# Patient Record
Sex: MALE | Race: WHITE | NOT HISPANIC OR LATINO | URBAN - METROPOLITAN AREA
[De-identification: names, ages, dates, MRNs, and addresses within clinical notes are randomized per-mention and may not be internally consistent; named-entity substitution may affect disease eponyms.]

---

## 2017-07-24 ENCOUNTER — OFFICE VISIT - HEALTHEAST (OUTPATIENT)
Dept: INTERNAL MEDICINE | Facility: CLINIC | Age: 52
End: 2017-07-24

## 2017-07-24 DIAGNOSIS — K21.9 GERD (GASTROESOPHAGEAL REFLUX DISEASE): ICD-10-CM

## 2017-07-24 DIAGNOSIS — Z00.00 ROUTINE GENERAL MEDICAL EXAMINATION AT A HEALTH CARE FACILITY: ICD-10-CM

## 2017-07-24 LAB
CHOLEST SERPL-MCNC: 216 MG/DL
FASTING STATUS PATIENT QL REPORTED: YES
HDLC SERPL-MCNC: 58 MG/DL
LDLC SERPL CALC-MCNC: 144 MG/DL
PSA SERPL-MCNC: 0.8 NG/ML (ref 0–3.5)
TRIGL SERPL-MCNC: 72 MG/DL

## 2017-07-24 ASSESSMENT — MIFFLIN-ST. JEOR: SCORE: 1911.27

## 2017-07-26 ENCOUNTER — COMMUNICATION - HEALTHEAST (OUTPATIENT)
Dept: INTERNAL MEDICINE | Facility: CLINIC | Age: 52
End: 2017-07-26

## 2017-10-09 ENCOUNTER — OFFICE VISIT - HEALTHEAST (OUTPATIENT)
Dept: INTERNAL MEDICINE | Facility: CLINIC | Age: 52
End: 2017-10-09

## 2017-10-09 ENCOUNTER — RECORDS - HEALTHEAST (OUTPATIENT)
Dept: GENERAL RADIOLOGY | Facility: CLINIC | Age: 52
End: 2017-10-09

## 2017-10-09 DIAGNOSIS — M25.552 LEFT HIP PAIN: ICD-10-CM

## 2017-10-09 DIAGNOSIS — Z23 NEED FOR IMMUNIZATION AGAINST INFLUENZA: ICD-10-CM

## 2017-10-09 DIAGNOSIS — M25.552 PAIN IN LEFT HIP: ICD-10-CM

## 2017-10-09 DIAGNOSIS — M54.16 LUMBAR RADICULOPATHY, CHRONIC: ICD-10-CM

## 2017-10-09 ASSESSMENT — MIFFLIN-ST. JEOR: SCORE: 1929.42

## 2017-10-10 ENCOUNTER — HOSPITAL ENCOUNTER (OUTPATIENT)
Dept: MRI IMAGING | Facility: CLINIC | Age: 52
Discharge: HOME | End: 2017-10-10
Attending: INTERNAL MEDICINE

## 2017-10-10 ENCOUNTER — COMMUNICATION - HEALTHEAST (OUTPATIENT)
Dept: INTERNAL MEDICINE | Facility: CLINIC | Age: 52
End: 2017-10-10

## 2017-10-10 DIAGNOSIS — M54.16 LUMBAR RADICULOPATHY, CHRONIC: ICD-10-CM

## 2017-10-13 ENCOUNTER — RECORDS - HEALTHEAST (OUTPATIENT)
Dept: ADMINISTRATIVE | Facility: OTHER | Age: 52
End: 2017-10-13

## 2017-11-21 ENCOUNTER — COMMUNICATION - HEALTHEAST (OUTPATIENT)
Dept: INTERNAL MEDICINE | Facility: CLINIC | Age: 52
End: 2017-11-21

## 2017-12-14 ENCOUNTER — COMMUNICATION - HEALTHEAST (OUTPATIENT)
Dept: INTERNAL MEDICINE | Facility: CLINIC | Age: 52
End: 2017-12-14

## 2017-12-18 ENCOUNTER — OFFICE VISIT - HEALTHEAST (OUTPATIENT)
Dept: INTERNAL MEDICINE | Facility: CLINIC | Age: 52
End: 2017-12-18

## 2017-12-18 DIAGNOSIS — M54.16 LEFT LUMBAR RADICULOPATHY: ICD-10-CM

## 2017-12-18 DIAGNOSIS — Z01.818 PREOP EXAMINATION: ICD-10-CM

## 2017-12-18 DIAGNOSIS — Z12.11 SCREEN FOR COLON CANCER: ICD-10-CM

## 2017-12-18 DIAGNOSIS — K21.00 GASTROESOPHAGEAL REFLUX DISEASE WITH ESOPHAGITIS: ICD-10-CM

## 2017-12-18 LAB
ATRIAL RATE - MUSE: 47 BPM
DIASTOLIC BLOOD PRESSURE - MUSE: NORMAL MMHG
INTERPRETATION ECG - MUSE: NORMAL
P AXIS - MUSE: 36 DEGREES
PR INTERVAL - MUSE: 180 MS
QRS DURATION - MUSE: 114 MS
QT - MUSE: 426 MS
QTC - MUSE: 377 MS
R AXIS - MUSE: -18 DEGREES
SYSTOLIC BLOOD PRESSURE - MUSE: NORMAL MMHG
T AXIS - MUSE: -11 DEGREES
VENTRICULAR RATE- MUSE: 47 BPM

## 2017-12-18 ASSESSMENT — MIFFLIN-ST. JEOR: SCORE: 1911.27

## 2018-05-31 ENCOUNTER — OFFICE VISIT - HEALTHEAST (OUTPATIENT)
Dept: INTERNAL MEDICINE | Facility: CLINIC | Age: 53
End: 2018-05-31

## 2018-05-31 DIAGNOSIS — J20.9 ACUTE BRONCHITIS, UNSPECIFIED ORGANISM: ICD-10-CM

## 2018-05-31 ASSESSMENT — MIFFLIN-ST. JEOR: SCORE: 1938.49

## 2018-06-21 ENCOUNTER — OFFICE VISIT - HEALTHEAST (OUTPATIENT)
Dept: INTERNAL MEDICINE | Facility: CLINIC | Age: 53
End: 2018-06-21

## 2018-06-21 DIAGNOSIS — J30.2 SEASONAL ALLERGIC RHINITIS: ICD-10-CM

## 2018-06-21 DIAGNOSIS — H65.90 SEROUS OTITIS MEDIA: ICD-10-CM

## 2018-06-21 ASSESSMENT — MIFFLIN-ST. JEOR: SCORE: 1952.1

## 2018-09-20 ENCOUNTER — COMMUNICATION - HEALTHEAST (OUTPATIENT)
Dept: INTERNAL MEDICINE | Facility: CLINIC | Age: 53
End: 2018-09-20

## 2019-03-05 ENCOUNTER — AMBULATORY - HEALTHEAST (OUTPATIENT)
Dept: INTERNAL MEDICINE | Facility: CLINIC | Age: 54
End: 2019-03-05

## 2019-03-05 DIAGNOSIS — Z85.038 ENCOUNTER FOR FOLLOW-UP SURVEILLANCE OF COLON CANCER: ICD-10-CM

## 2019-03-05 DIAGNOSIS — Z08 ENCOUNTER FOR FOLLOW-UP SURVEILLANCE OF COLON CANCER: ICD-10-CM

## 2019-07-08 ENCOUNTER — OFFICE VISIT - HEALTHEAST (OUTPATIENT)
Dept: INTERNAL MEDICINE | Facility: CLINIC | Age: 54
End: 2019-07-08

## 2019-07-08 DIAGNOSIS — B00.9 HERPES SIMPLEX VIRUS INFECTION: ICD-10-CM

## 2019-07-08 DIAGNOSIS — J01.90 ACUTE NON-RECURRENT SINUSITIS, UNSPECIFIED LOCATION: ICD-10-CM

## 2019-07-08 ASSESSMENT — MIFFLIN-ST. JEOR: SCORE: 1915.81

## 2019-08-15 ENCOUNTER — RECORDS - HEALTHEAST (OUTPATIENT)
Dept: ADMINISTRATIVE | Facility: OTHER | Age: 54
End: 2019-08-15

## 2019-08-19 ENCOUNTER — OFFICE VISIT - HEALTHEAST (OUTPATIENT)
Dept: SURGERY | Facility: CLINIC | Age: 54
End: 2019-08-19

## 2019-08-19 DIAGNOSIS — K61.0 PERIANAL ABSCESS: ICD-10-CM

## 2019-08-19 ASSESSMENT — MIFFLIN-ST. JEOR: SCORE: 1925.79

## 2019-09-16 ENCOUNTER — OFFICE VISIT - HEALTHEAST (OUTPATIENT)
Dept: SURGERY | Facility: CLINIC | Age: 54
End: 2019-09-16

## 2019-09-16 DIAGNOSIS — K64.9 HEMORRHOIDS, UNSPECIFIED HEMORRHOID TYPE: ICD-10-CM

## 2019-10-02 ENCOUNTER — COMMUNICATION - HEALTHEAST (OUTPATIENT)
Dept: INTERNAL MEDICINE | Facility: CLINIC | Age: 54
End: 2019-10-02

## 2019-10-02 DIAGNOSIS — K21.00 GASTROESOPHAGEAL REFLUX DISEASE WITH ESOPHAGITIS: ICD-10-CM

## 2019-11-20 ENCOUNTER — RECORDS - HEALTHEAST (OUTPATIENT)
Dept: ADMINISTRATIVE | Facility: OTHER | Age: 54
End: 2019-11-20

## 2020-01-21 ENCOUNTER — COMMUNICATION - HEALTHEAST (OUTPATIENT)
Dept: INTERNAL MEDICINE | Facility: CLINIC | Age: 55
End: 2020-01-21

## 2020-01-21 ENCOUNTER — AMBULATORY - HEALTHEAST (OUTPATIENT)
Dept: INTERNAL MEDICINE | Facility: CLINIC | Age: 55
End: 2020-01-21

## 2020-01-21 DIAGNOSIS — R20.0 NUMBNESS IN BOTH LEGS: ICD-10-CM

## 2020-01-29 ENCOUNTER — RECORDS - HEALTHEAST (OUTPATIENT)
Dept: ADMINISTRATIVE | Facility: OTHER | Age: 55
End: 2020-01-29

## 2020-04-01 ENCOUNTER — OFFICE VISIT - HEALTHEAST (OUTPATIENT)
Dept: INTERNAL MEDICINE | Facility: CLINIC | Age: 55
End: 2020-04-01

## 2020-04-01 DIAGNOSIS — J01.90 SUBACUTE SINUSITIS, UNSPECIFIED LOCATION: ICD-10-CM

## 2020-06-01 ENCOUNTER — COMMUNICATION - HEALTHEAST (OUTPATIENT)
Dept: INTERNAL MEDICINE | Facility: CLINIC | Age: 55
End: 2020-06-01

## 2020-06-05 ENCOUNTER — OFFICE VISIT - HEALTHEAST (OUTPATIENT)
Dept: INTERNAL MEDICINE | Facility: CLINIC | Age: 55
End: 2020-06-05

## 2020-06-05 DIAGNOSIS — Z01.818 PREOPERATIVE EXAMINATION: ICD-10-CM

## 2020-06-05 DIAGNOSIS — Z00.00 ROUTINE HISTORY AND PHYSICAL EXAMINATION OF ADULT: ICD-10-CM

## 2020-06-05 DIAGNOSIS — J32.9 CHRONIC SINUSITIS, UNSPECIFIED LOCATION: ICD-10-CM

## 2020-06-05 LAB
ANION GAP SERPL CALCULATED.3IONS-SCNC: 8 MMOL/L (ref 5–18)
ATRIAL RATE - MUSE: 48 BPM
BASOPHILS # BLD AUTO: 0 THOU/UL (ref 0–0.2)
BASOPHILS NFR BLD AUTO: 0 % (ref 0–2)
BUN SERPL-MCNC: 16 MG/DL (ref 8–22)
CALCIUM SERPL-MCNC: 9.8 MG/DL (ref 8.5–10.5)
CHLORIDE BLD-SCNC: 103 MMOL/L (ref 98–107)
CO2 SERPL-SCNC: 30 MMOL/L (ref 22–31)
CREAT SERPL-MCNC: 0.97 MG/DL (ref 0.7–1.3)
DIASTOLIC BLOOD PRESSURE - MUSE: NORMAL
EOSINOPHIL # BLD AUTO: 0.3 THOU/UL (ref 0–0.4)
EOSINOPHIL NFR BLD AUTO: 5 % (ref 0–6)
ERYTHROCYTE [DISTWIDTH] IN BLOOD BY AUTOMATED COUNT: 12.5 % (ref 11–14.5)
GFR SERPL CREATININE-BSD FRML MDRD: >60 ML/MIN/1.73M2
GLUCOSE BLD-MCNC: 105 MG/DL (ref 70–125)
HCT VFR BLD AUTO: 45 % (ref 40–54)
HGB BLD-MCNC: 15.4 G/DL (ref 14–18)
INTERPRETATION ECG - MUSE: NORMAL
LYMPHOCYTES # BLD AUTO: 2.1 THOU/UL (ref 0.8–4.4)
LYMPHOCYTES NFR BLD AUTO: 41 % (ref 20–40)
MCH RBC QN AUTO: 31 PG (ref 27–34)
MCHC RBC AUTO-ENTMCNC: 34.1 G/DL (ref 32–36)
MCV RBC AUTO: 91 FL (ref 80–100)
MONOCYTES # BLD AUTO: 0.3 THOU/UL (ref 0–0.9)
MONOCYTES NFR BLD AUTO: 6 % (ref 2–10)
NEUTROPHILS # BLD AUTO: 2.4 THOU/UL (ref 2–7.7)
NEUTROPHILS NFR BLD AUTO: 48 % (ref 50–70)
P AXIS - MUSE: 35 DEGREES
PLATELET # BLD AUTO: 265 THOU/UL (ref 140–440)
PMV BLD AUTO: 7.4 FL (ref 7–10)
POTASSIUM BLD-SCNC: 4.8 MMOL/L (ref 3.5–5)
PR INTERVAL - MUSE: 178 MS
QRS DURATION - MUSE: 96 MS
QT - MUSE: 424 MS
QTC - MUSE: 378 MS
R AXIS - MUSE: -20 DEGREES
RBC # BLD AUTO: 4.95 MILL/UL (ref 4.4–6.2)
SODIUM SERPL-SCNC: 141 MMOL/L (ref 136–145)
SYSTOLIC BLOOD PRESSURE - MUSE: NORMAL
T AXIS - MUSE: 1 DEGREES
VENTRICULAR RATE- MUSE: 48 BPM
WBC: 5.1 THOU/UL (ref 4–11)

## 2020-06-05 RX ORDER — OXYMETAZOLINE HYDROCHLORIDE 0.05 G/100ML
2 SPRAY NASAL 2 TIMES DAILY
Status: SHIPPED | COMMUNITY
Start: 2020-06-05

## 2020-06-05 ASSESSMENT — MIFFLIN-ST. JEOR: SCORE: 1906.74

## 2020-06-08 ENCOUNTER — COMMUNICATION - HEALTHEAST (OUTPATIENT)
Dept: INTERNAL MEDICINE | Facility: CLINIC | Age: 55
End: 2020-06-08

## 2020-06-19 ENCOUNTER — RECORDS - HEALTHEAST (OUTPATIENT)
Dept: ADMINISTRATIVE | Facility: OTHER | Age: 55
End: 2020-06-19

## 2020-07-04 ENCOUNTER — COMMUNICATION - HEALTHEAST (OUTPATIENT)
Dept: INTERNAL MEDICINE | Facility: CLINIC | Age: 55
End: 2020-07-04

## 2020-07-04 DIAGNOSIS — K21.00 GASTROESOPHAGEAL REFLUX DISEASE WITH ESOPHAGITIS: ICD-10-CM

## 2020-09-10 ENCOUNTER — COMMUNICATION - HEALTHEAST (OUTPATIENT)
Dept: INTERNAL MEDICINE | Facility: CLINIC | Age: 55
End: 2020-09-10

## 2020-10-08 ENCOUNTER — COMMUNICATION - HEALTHEAST (OUTPATIENT)
Dept: INTERNAL MEDICINE | Facility: CLINIC | Age: 55
End: 2020-10-08

## 2020-10-08 DIAGNOSIS — K21.00 GASTROESOPHAGEAL REFLUX DISEASE WITH ESOPHAGITIS: ICD-10-CM

## 2020-10-09 RX ORDER — PANTOPRAZOLE SODIUM 20 MG/1
TABLET, DELAYED RELEASE ORAL
Qty: 90 TABLET | Refills: 2 | Status: SHIPPED | OUTPATIENT
Start: 2020-10-09

## 2021-05-30 NOTE — PROGRESS NOTES
[facility] Follow Up Note    Marcello Mariee   54 y.o. male    Date of Visit: 7/8/2019    Chief Complaint   Patient presents with     Sinus Problem     started on Frrday     Cough     Mouth Lesions     wondering about a script     Subjective  This is a 54-year-old patient of Dr. Partha Weir.  He comes in because of what sounds like a sinus infection.  Over the past 4 days he has noticed congestion and drainage with a productive cough.  He reports a lack of energy but no fever or chills.  He tells me that he normally gets these about once a year but it is usually in the middle of winter.  He has been under some stress and just recently returned from by very active trip in [place].  In addition to the above he is developed a herpes simplex type of lesion on his upper lip.    ROS A comprehensive review of systems was performed and was otherwise negative    Medications, allergies, and problem list were reviewed and updated    Exam  General Appearance:   On examination his blood pressure is 118/60.  Weight is 224 pounds and height is 74 inches.  BMI is 28.76.    Heart rhythm is stable with a rate of 55 and no ectopy.    Lungs are clear.    No enlarged cervical lymph nodes.    No facial tenderness.    He does have a cold sore in his upper lip.    The patient is alert and oriented x3.      Assessment/Plan  1. Acute non-recurrent sinusitis, unspecified location  azithromycin (ZITHROMAX Z-ANDRE) 250 MG tablet   2. Herpes simplex virus infection  valACYclovir (VALTREX) 1000 MG tablet     Acute sinusitis.  He has responded well to Zithromax in the past and so we will get him on a Z-Andre.    Herpes simplex of the upper lip.  We will get him on 2 days of Valtrex to help him with this.  He will follow-up if he is not improving.  Body Mass Index was not assessed due to Patient was in with an acute medical issue..    Jude Garcia MD      Current Outpatient Medications on File Prior to Visit   Medication  Sig     fexofenadine-pseudoephedrine (ALLEGRA-D 24 HOUR) 180-240 mg per 24 hr tablet Take 1 tablet by mouth daily.     pantoprazole (PROTONIX) 20 MG tablet TAKE ONE T PO QD     No current facility-administered medications on file prior to visit.      No Known Allergies  Social History     Tobacco Use     Smoking status: Former Smoker     Smokeless tobacco: Never Used   Substance Use Topics     Alcohol use: Not on file     Drug use: Not on file

## 2021-05-31 VITALS — WEIGHT: 223 LBS | HEIGHT: 74 IN | BODY MASS INDEX: 28.62 KG/M2

## 2021-05-31 VITALS — BODY MASS INDEX: 29.13 KG/M2 | WEIGHT: 227 LBS | HEIGHT: 74 IN

## 2021-05-31 VITALS — BODY MASS INDEX: 28.62 KG/M2 | HEIGHT: 74 IN | WEIGHT: 223 LBS

## 2021-05-31 NOTE — PROGRESS NOTES
HPI:  Marcello Mariee is a 54 y.o. male who was referred to me by Partha Weir MD for evaluation of a perianal abscess.  He had developed a perianal abscess in [place] and subsequently underwent an incision and drainage procedure in the emergency room approximately 4 days ago.  Since then he has had scant drainage but presents for evaluation.  He denies any fevers, chills, nausea or vomiting.  He has no history of constipation is never had a perianal abscess in the past.    Allergies:Patient has no known allergies.    No past medical history on file.    No past surgical history on file.    CURRENT MEDS:  Current Outpatient Medications   Medication Sig Dispense Refill     amoxicillin-clavulanate (AUGMENTIN) 875-125 mg per tablet TK 1 T PO Q 12 H  0     fexofenadine-pseudoephedrine (ALLEGRA-D 24 HOUR) 180-240 mg per 24 hr tablet Take 1 tablet by mouth daily. 30 tablet 12     ibuprofen (ADVIL,MOTRIN) 800 MG tablet TK 1 T PO TID PRN  0     pantoprazole (PROTONIX) 20 MG tablet TAKE ONE T PO QD 90 tablet 3     No current facility-administered medications for this visit.        Family history-reviewed and noncontributory to the current admission     reports that he has quit smoking. He has never used smokeless tobacco.    Review of Systems -   The 12 system review is within normal limits except for as mentioned above.  General ROS: No complaints or constitutional symptoms  Ophthalmic ROS: No complaints of visual changes  Skin: No complaints or symptoms   Endocrine: No complaints or symptoms  Hematologic/Lymphatic: No symptoms or complaints  Psychiatric: No symptoms or complaints  Respiratory ROS: no cough, shortness of breath, or wheezing  Cardiovascular ROS: no chest pain or dyspnea on exertion  Gastrointestinal ROS: As per HPI  Genito-Urinary ROS: no dysuria, trouble voiding, or hematuria  Musculoskeletal ROS: no joint or muscle pain  Neurological ROS: no TIA or stroke symptoms    /72   Pulse 63   Resp 16   " Ht 6' 2\" (1.88 m)   Wt (!) 226 lb 3.2 oz (102.6 kg)   SpO2 98%   BMI 29.04 kg/m    Body mass index is 29.04 kg/m .    EXAM:  GENERAL: Well developed male, No acute distress, pleasant and conversant   EYES: Pupils equal, round and reactive, no scleral icterus  CARDIAC: Regular rate and rhythm, no obvious murmurs noted  CHEST/LUNG: Clear to ascultation bilaterally, No ronchi, No wheezes  ABDOMEN: Soft, nontender  Anus- small left-sided perianal incision with no stigmata of infection, scant drainage  SKIN: Pink, warm and dry, no obvious rashes or lesions   NEURO:No focal deficits, ambulatory  MUSCULOSKELETAL:No obvious deformities, no swelling, normal appearing      Assessment/Plan:   Marcello Mariee is a 54 y.o. male with a recent incision and drainage of a perianal abscess.  I discussed the pathophysiology of perianal abscesses as well as the possibility of fistula development in the future.  At this point because he was drained appropriately there is no need for any further surgical intervention.  I have recommend that he continue maintenance and surveillance on the wound to ensure that this does not recur.  If it does not we may need to perform an exam under anesthesia to evaluate whether or not there is a fistula.  He understands everything that was discussed and will follow-up as needed.      Perez Cerda D.O. [facility]  [phone]  [facility] Department of Surgery  "

## 2021-06-01 VITALS — BODY MASS INDEX: 29.77 KG/M2 | WEIGHT: 232 LBS | HEIGHT: 74 IN

## 2021-06-01 VITALS — WEIGHT: 229 LBS | BODY MASS INDEX: 29.39 KG/M2 | HEIGHT: 74 IN

## 2021-06-01 NOTE — TELEPHONE ENCOUNTER
Refill Approved    Rx renewed per Medication Renewal Policy. Medication was last renewed on 9/20/18.    Niki Currie, [facility] Triage/Med Refill 10/2/2019     Requested Prescriptions   Pending Prescriptions Disp Refills     pantoprazole (PROTONIX) 20 MG tablet [Pharmacy Med Name: PANTOPRAZOLE 20MG TABLETS] 90 tablet 0     Sig: TAKE 1 TABLET BY MOUTH EVERY DAY       GI Medications Refill Protocol Passed - 10/2/2019  3:29 AM        Passed - PCP or prescribing provider visit in last 12 or next 3 months.     Last office visit with prescriber/PCP: 6/21/2018 Partha Weir MD OR same dept: 7/8/2019 Jude Garcia MD OR same specialty: 7/8/2019 Jude Garcia MD  Last physical: 12/18/2017 Last MTM visit: Visit date not found   Next visit within 3 mo: Visit date not found  Next physical within 3 mo: Visit date not found  Prescriber OR PCP: Partha Weir MD  Last diagnosis associated with med order: There are no diagnoses linked to this encounter.  If protocol passes may refill for 12 months if within 3 months of last provider visit (or a total of 15 months).

## 2021-06-01 NOTE — PROGRESS NOTES
HPI: Marcello Mariee is here for follow up to discuss some perianal discomfort he is noticing.  He feels as though he has had leakage from his anus as well as a sharp to dull ache fullness.  He did state that it feels differently from his previous perianal abscess that was drained in [place].  Denies any fevers chills nausea vomiting.    Allergies, Medications, Social History, Past Medical History and Past Surgical History were reviewed and are noted in the chart.    There were no vitals taken for this visit.  There is no height or weight on file to calculate BMI.      EXAM:   GENERAL: Appears well  EXTREM: Gluteal region- 1.5 cm hemorrhoid in resolution with no stigmata of infection or abscess         Assessment/Plan: Marcello Mariee has symptoms which are related to his hemorrhoid and likely not a recurrent perianal abscess as I do not see any signs of infection at this time.  We discussed bowel hygiene as well as perianal hygiene including wipes and Proctofoam or Preparation H as needed.  He understands having that was discussed and will follow up on a as needed basis.    Nitish Cerda DO [facility] Department of Surgery

## 2021-06-03 VITALS — HEIGHT: 74 IN | BODY MASS INDEX: 28.75 KG/M2 | WEIGHT: 224 LBS

## 2021-06-03 VITALS — WEIGHT: 226.2 LBS | BODY MASS INDEX: 29.03 KG/M2 | HEIGHT: 74 IN

## 2021-06-04 VITALS
SYSTOLIC BLOOD PRESSURE: 130 MMHG | WEIGHT: 222 LBS | DIASTOLIC BLOOD PRESSURE: 82 MMHG | HEART RATE: 49 BPM | HEIGHT: 74 IN | OXYGEN SATURATION: 98 % | BODY MASS INDEX: 28.49 KG/M2

## 2021-06-05 NOTE — TELEPHONE ENCOUNTER
Referral Request  Type of referral: Referral for [facility]  Who s requesting: Patient  Why the request: Patient is having some numbness again in the legs and wanting [facility] again for this.  Have you been seen for this request: Yes  Does patient have a preference on a group/provider? [facility]  Okay to leave a detailed message?  Yes

## 2021-06-05 NOTE — TELEPHONE ENCOUNTER
Karen Palafox for referral requested below?  Patient does not have an establish care visit with any providers scheduled at this time.  Jennifer CAPELLAN, JAQUELINE/CMT....................1:19 PM

## 2021-06-07 NOTE — PROGRESS NOTES
"Marcello Mariee is a 54 y.o. male who is being evaluated via a billable telephone visit.      The patient has been notified of following:     \"This telephone visit will be conducted via a call between you and your physician/provider. We have found that certain health care needs can be provided without the need for a physical exam.  This service lets us provide the care you need with a short phone conversation.  If a prescription is necessary we can send it directly to your pharmacy.  If lab work is needed we can place an order for that and you can then stop by our lab to have the test done at a later time.    If during the course of the call the physician/provider feels a telephone visit is not appropriate, you will not be charged for this service.\"     Patient has given verbal consent to a Telephone visit? Yes    Marcello Mariee complains of    Chief Complaint   Patient presents with     Sinus Problem     had same thing a year ago, took allegra-D and possibly an abx last time. nasal congestion, earache on/off, sinuses feel tight X 1 month. recent travel [place], [place] 3/15/20       I have reviewed and updated the patient's Past Medical History, Social History, Family History and Medication List.    ALLERGIES  Patient has no known allergies.    Additional provider notes: Telephone call was made to this man.  He gives almost 1 month history of what he describes as sinus congestion.  Some nasal congestion.  Postnasal drip.  Minimal cough.  Some tightness in the upper chest.  Some popping and pressure in the ears.  No fevers.  No bad sore throat.  No chest congestion or dyspnea.    Assessment/Plan:  Symptoms compatible with a sinusitis that will not go away.  He has been trying over-the-counter Allegra-D along with some nasal saline irrigation.  Now a lot better.  We will add Augmentin 1 tablet twice daily for 7 days.      Phone call duration:  [id number] minutes    Julieta Richardson [facility]    "

## 2021-06-08 NOTE — PROGRESS NOTES
Preoperative Exam    Scheduled Procedure: Nasal sinus Ethmotectomy  Surgery Date:  6-  Surgery Location: [facility], [place], fax [phone]    Surgeon:  Dr Chadd Singer    Assessment/Plan:     1. Preoperative examination  Overall in good health . Always had sinus bradycardia . No change in EKG . exercises regularly . Uses a swallowed steroid inhaler for chronic eosonophilic esophagitis.    - Electrocardiogram Perform and Read strong family history of IHD , father and brother , coronary Calcium score zero but has a h/o high LDL . No other risk factors . Should get lipids repeated   - HM1(CBC and Differential)  - Basic Metabolic Panel  - HM1 (CBC with Diff)    2. Chronic sinusitis, unspecified location    3. Routine history and physical examination of adult  Should get his screening labs and cancer screening done . Immunization up todate except shingrix . Has not had a physical since 2017 . Talked at length about Cardiac risk factor assessment .   - Lipid [place] FASTING; Future  - Glucose; Future  - PSA, Annual Screen (Prostatic-Specific Antigen); Future  - Ambulatory referral for Colonoscopy        Surgical Procedure Risk: Low (reported cardiac risk generally < 1%)  Have you had prior anesthesia?: Yes  Have you or any family members had a previous anesthesia reaction:  No  Do you or any family members have a history of a clotting or bleeding disorder?: No  Cardiac Risk Assessment: no increased risk for major cardiac complications    APPROVAL GIVEN to proceed with proposed procedure, without further diagnostic evaluation    Please Note: can retrun for fasting blood work     Functional Status: Independent  Patient plans to recover at home with family.     Subjective:      Marcello Mariee is a 55 y.o. male who presents for a preoperative consultation.  H/o chronic sinusitis , no h/o diabetes, IHD , stroke or DVTS . Prior [place] experience is normal . Not on aspirin .     All other systems reviewed  and are negative, other than those listed in the HPI.    Pertinent History  Do you have difficulty breathing or chest pain after walking up a flight of stairs: No  History of obstructive sleep apnea: No  Steroid use in the last 6 months: Yes: Prednisone use 1 month ago and steriod inhaler  Frequent Aspirin/NSAID use: No  Prior Blood Transfusion: No  Prior Blood Transfusion Reaction: No  If for some reason prior to, during or after the procedure, if it is medically indicated, would you be willing to have a blood transfusion?:  There is no transfusion refusal.    Current Outpatient Medications   Medication Sig Dispense Refill     oxymetazoline (AFRIN) 0.05 % nasal spray Apply 2 sprays into each nostril 2 (two) times a day.       pantoprazole (PROTONIX) 20 MG tablet TAKE 1 TABLET BY MOUTH EVERY DAY 90 tablet 2     No current facility-administered medications for this visit.         No Known Allergies    Patient Active Problem List   Diagnosis     GERD (gastroesophageal reflux disease)     BCC (basal cell carcinoma), trunk     Eosinophilic esophagitis       No past medical history on file.    No past surgical history on file.    Social History     Socioeconomic History     Marital status:      Spouse name: Not on file     Number of children: Not on file     Years of education: Not on file     Highest education level: Not on file   Occupational History     Not on file   Social Needs     Financial resource strain: Not on file     Food insecurity     Worry: Not on file     Inability: Not on file     Transportation needs     Medical: Not on file     Non-medical: Not on file   Tobacco Use     Smoking status: Former Smoker     Smokeless tobacco: Never Used   Substance and Sexual Activity     Alcohol use: Not on file     Drug use: Not on file     Sexual activity: Not on file   Lifestyle     Physical activity     Days per week: Not on file     Minutes per session: Not on file     Stress: Not on file   Relationships      "Social connections     Talks on phone: Not on file     Gets together: Not on file     Attends [employer] service: Not on file     Active member of club or organization: Not on file     Attends meetings of clubs or organizations: Not on file     Relationship status: Not on file     Intimate partner violence     Fear of current or ex partner: Not on file     Emotionally abused: Not on file     Physically abused: Not on file     Forced sexual activity: Not on file   Other Topics Concern     Not on file   Social History Narrative     Not on file             Objective:     Vitals:    06/05/20 1137   BP: 130/82   Pulse: (!) 49   SpO2: 98%   Weight: 222 lb (100.7 kg)   Height: 6' 2\" (1.88 m)         Physical Exam:  Physical Examination: General appearance - alert, well appearing, and in no distress  Mental status - alert, oriented to person, place, and time  Neck - supple, no significant adenopathy  Lymphatics - no palpable lymphadenopathy, no hepatosplenomegaly  Chest - clear to auscultation, no wheezes, rales or rhonchi, symmetric air entry  Heart - normal rate, regular rhythm, normal S1, S2, no murmurs, rubs, clicks or gallops  Abdomen - soft, nontender, nondistended, no masses or organomegaly  Extremities - peripheral pulses normal, no pedal edema, no clubbing or cyanosis  Skin - normal coloration and turgor, no rashes, no suspicious skin lesions noted  Old skin cancer wound and cryo marks     There are no Patient Instructions on file for this visit.    EKG:  NSR , NAD unchanged   Sinus abundio  Labs:  Labs pending at this time.  Results will be reviewed when available.    Immunization History   Administered Date(s) Administered     DT (pediatric) 04/13/2001     Hep A, Adult IM (19yr & older) 06/30/2015     Influenza, Seasonal, Inj PF IIV3 09/16/2009, 09/17/2010, 08/22/2012     Influenza,seasonal quad, PF, =/> 6months 10/09/2017     MMR 06/30/2015     Td, Adult, Absorbed 04/13/2001     Tdap 08/22/2012     Typhoid, ViCPs " 06/30/2015           Electronically signed by Ada Wyman MD 06/05/20 11:38 AM

## 2021-06-08 NOTE — TELEPHONE ENCOUNTER
Spoke with the patient and was able to schedule an appointment for him to see Dr. Wyman on Friday, 6/5/2020 for a pre-op.  Patient will discuss establishing care with Dr. Wyman at that time as well.  He had no further questions at this time.  Jennifer CAPELLAN CMA/SHANNAN....................8:25 AM

## 2021-06-08 NOTE — TELEPHONE ENCOUNTER
New Appointment Needed  What is the reason for the visit:    Pre-Op Appt Request  When is the surgery? :  6/24/20  Where is the surgery?:   Patient did not give info  Who is the surgeon? :    What type of surgery is being done?:   Provider Preference: Any available  How soon do you need to be seen?: patient does not have a PCP no more and needs to be scheduled for a Pre op on 6/18/20 or 6/19/20 and also a Covid test  Waitlist offered?: No  Okay to leave a detailed message:  No

## 2021-06-09 NOTE — TELEPHONE ENCOUNTER
RN cannot approve Refill Request    RN can NOT refill this medication No established PCP. Last office visit: 6/21/2018 Partha Weir MD Last Physical: 12/18/2017 Last MTM visit: Visit date not found Last visit same specialty: 7/8/2019 Jude Garcia MD.  Next visit within 3 mo: Visit date not found  Next physical within 3 mo: Visit date not found      Cammy Robertson, [facility] Triage/Med Refill 7/4/2020    Requested Prescriptions   Pending Prescriptions Disp Refills     pantoprazole (PROTONIX) 20 MG tablet [Pharmacy Med Name: PANTOPRAZOLE 20MG TABLETS] 90 tablet 2     Sig: TAKE 1 TABLET BY MOUTH EVERY DAY       GI Medications Refill Protocol Passed - 7/4/2020  3:28 AM        Passed - PCP or prescribing provider visit in last 12 or next 3 months.     Last office visit with prescriber/PCP: 6/21/2018 Partha Weir MD OR same dept: 7/8/2019 Jude Garcia MD OR same specialty: 7/8/2019 Jude Garcia MD  Last physical: 12/18/2017 Last MTM visit: Visit date not found   Next visit within 3 mo: Visit date not found  Next physical within 3 mo: Visit date not found  Prescriber OR PCP: Partha Weir MD  Last diagnosis associated with med order: 1. Gastroesophageal reflux disease with esophagitis  - pantoprazole (PROTONIX) 20 MG tablet [Pharmacy Med Name: PANTOPRAZOLE 20MG TABLETS]; TAKE 1 TABLET BY MOUTH EVERY DAY  Dispense: 90 tablet; Refill: 2    If protocol passes may refill for 12 months if within 3 months of last provider visit (or a total of 15 months).

## 2021-06-11 NOTE — TELEPHONE ENCOUNTER
New Appointment Needed  What is the reason for the visit:    Pre-Op Appt Request  When is the surgery? :  9/14 or 9/15  Where is the surgery?:   [facility]   Who is the surgeon? :  Dr. Misael Yañez   What type of surgery is being done?: Meniscus repair   Provider Preference: Any available  How soon do you need to be seen?: asap  Waitlist offered?: No  Okay to leave a detailed message:  Yes

## 2021-06-12 NOTE — TELEPHONE ENCOUNTER
Refill Approved    Rx renewed per Medication Renewal Policy. Medication was last renewed on 7/6/20.    Niki Currie, [facility] Connection Triage/Med Refill 10/9/2020     Requested Prescriptions   Pending Prescriptions Disp Refills     pantoprazole (PROTONIX) 20 MG tablet [Pharmacy Med Name: PANTOPRAZOLE 20MG TABLETS] 90 tablet 0     Sig: TAKE 1 TABLET BY MOUTH EVERY DAY       GI Medications Refill Protocol Passed - 10/8/2020 12:37 PM        Passed - PCP or prescribing provider visit in last 12 or next 3 months.     Last office visit with prescriber/PCP: Visit date not found OR same dept: Visit date not found OR same specialty: 7/8/2019 Jude Garcia MD  Last physical: Visit date not found Last MTM visit: Visit date not found   Next visit within 3 mo: Visit date not found  Next physical within 3 mo: Visit date not found  Prescriber OR PCP: Shakeel Pierre MD  Last diagnosis associated with med order: 1. Gastroesophageal reflux disease with esophagitis  - pantoprazole (PROTONIX) 20 MG tablet [Pharmacy Med Name: PANTOPRAZOLE 20MG TABLETS]; TAKE 1 TABLET BY MOUTH EVERY DAY  Dispense: 90 tablet; Refill: 0    If protocol passes may refill for 12 months if within 3 months of last provider visit (or a total of 15 months).

## 2021-06-12 NOTE — PROGRESS NOTES
Office Visit - Follow up    Marcello Mariee   52 y.o. male    Date of Visit: 2017    Chief Complaint   Patient presents with     Annual Exam     Pt is fasting       Subjective: Delightful new patient here for physical exam.  Previously has been followed at health [facility] and previous records reviewed    Employed as an executive at a downtown company here.  Lives in an [facility].  Is very active physically with tennis hunting and hockey.    Lives at home with his wife ethanol intake minimal non-smoker (wife is Dr. 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    Recently seen by gastroenterology and these notes are reviewed    Seen by nurse practitioner who suggested adjustment of PPI and addition of fluticasone.    Previously had been on single dose of omeprazole which had been increased because of increase in symptoms to 40 mg daily    Has had what he perceives to be side effects related to omeprazole with some nausea and not feeling well has prominent reflux symptoms with pyrosis burning and general dyspepsia without recent dysphasia.  History of recent dysphasia with endoscopy and subsequent dilatation    Generally he is still having significant problems with dyspepsia and reflux despite switching to Protonix at a low dose of 20 mg daily he has attempted to adjust diet as well    Otherwise feels well without major concerns but does have questions regarding familial history of heart disease in his father who  suddenly at age 78    Risk factor profile reviewed and cardiovascular risk calculation indicates a 2.5% 10 year cardiac event risk and I discussed this at length with him    Continue with  "current lifestyle changes although is at low risk and I did reassure him    Follow-up labs are pending at this time    ROS: A comprehensive review of systems was performed and was otherwise negative except as mentioned above.     Exam  Very pleasant alert and oriented normal cognitive function skin and lymphatics normal EENT negative lungs clear heart normal abdomen benign extremities negative neuro exam normal remainder of comprehensive physical including rectal negative   /76  Pulse (!) 58  Ht 6' 2\" (1.88 m)  Wt (!) 223 lb (101.2 kg)  BMI 28.63 kg/m2    Assessment and Plan  Chronic reflux refractory to current management no improvement on Protonix.    Will plan to resume omeprazole at a dose of 20 mg twice daily will also add sucralfate 1 g twice daily to 4 times daily.  Discussed dietary modification and follow-up by phone in the next 3 weeks or so    Review labs when available    Otherwise negative exam in this pleasant new patient    Marcello was seen today for annual exam.    Diagnoses and all orders for this visit:    Routine general medical examination at a health care facility  -     2(CBC w/o Differential); Future  -     Comprehensive Metabolic Panel; Future  -     Lipid Cascade; Future  -     PSA (Prostatic-Specific Antigen), Annual Screen  -     Vitamin D, Total (25-Hydroxy)  -     2(CBC w/o Differential)  -     Comprehensive Metabolic Panel  -     Lipid Cascade    GERD (gastroesophageal reflux disease)    Other orders  -     sucralfate (CARAFATE) 1 gram tablet; Take 1 tablet (1 g total) by mouth 2 (two) times a day with meals.          Time: total time spent with the patient was 45 minutes of which >50% was spent in counseling and coordination of care        No Known Allergies    Medications :  Prior to Admission medications    Medication Sig Start Date End Date Taking? Authorizing Provider   pantoprazole (PROTONIX) 20 MG tablet TAKE ONE T PO QD 7/20/17  Yes PROVIDER, HISTORICAL "   sucralfate (CARAFATE) 1 gram tablet Take 1 tablet (1 g total) by mouth 2 (two) times a day with meals. 7/24/17 7/24/18  Partha Weir MD        Past Medical History: No past medical history on file.    Past Surgical History: No past surgical history on file.    Social History:   Social History     Social History     Marital status:      Spouse name: N/A     Number of children: N/A     Years of education: N/A     Occupational History     Not on file.     Social History Main Topics     Smoking status: Former Smoker     Smokeless tobacco: Never Used     Alcohol use Not on file     Drug use: Not on file     Sexual activity: Not on file     Other Topics Concern     Not on file     Social History Narrative     No narrative on file       Family History: No family history on file.      Partha Weir MD

## 2021-06-13 NOTE — PROGRESS NOTES
"  Office Visit - Follow Up   Marcello Mariee   52 y.o. male    Date of Visit: 10/9/2017    Chief Complaint   Patient presents with     Numbness     Lt leg numbness x 3-4 months - has done previous PT exercises help, has seen chiropractor        Assessment and Plan   1. Lumbar radiculopathy, chronic  Persistent lumbar radicular symptoms mainly with numbness.  No weakness noted.  Will reimage and have him follow-up with orthopedics.  Hold on any medicines at this point given his lack of pain.  - MR Lumbar Spine Without Contrast; Future  - Ambulatory referral to Orthopedic Surgery    2. Left hip pain  He does have some discomfort in the left hip area.  He could have a underlying hip arthritis as well.  Check x-ray.  - XR Hip Left 2 or More VWS; Future        Return if symptoms worsen or fail to improve.     History of Present Illness   This 52 y.o. old pleasant gentleman who has seen Dr. Weir once comes in today for evaluation of left leg numbness.  He has a history of lumbar radiculopathy that goes back about 6 years.  He has had injections in the epidural space in the past for pain and numbness.  Over the last couple months he has noted increasing numbness that comes up and down the left leg.  He also notes some hip discomfort.  It is getting hard for him to skate like he likes to when he plays hockey.  He has not fallen.  No bowel or bladder incontinence.  He has seen Dr. Yañez for this in the past.  He is Dr. Yañez his brother-in-law.    Review of Systems: A comprehensive review of systems was negative except as noted.     Medications, Allergies and Problem List   Reviewed and updated     Physical Exam   General Appearance:   [place] gentleman in no distress.  No focal neurologic deficits.  He has normal heel and toe walk bilaterally.    /60 (Patient Site: Right Arm, Patient Position: Sitting, Cuff Size: Adult Large)  Pulse (!) 54  Ht 6' 2\" (1.88 m)  Wt (!) 227 lb (103 kg)  BMI 29.15 kg/m2     "     Additional Information   Current Outpatient Prescriptions   Medication Sig Dispense Refill     pantoprazole (PROTONIX) 20 MG tablet TAKE ONE T PO QD  3     sucralfate (CARAFATE) 1 gram tablet Take 1 tablet (1 g total) by mouth 2 (two) times a day with meals. 120 tablet 1     No current facility-administered medications for this visit.      No Known Allergies  Social History   Substance Use Topics     Smoking status: Former Smoker     Smokeless tobacco: Never Used     Alcohol use None       Review and/or order of clinical lab tests:  Review and/or order of radiology tests:  Review and/or order of medicine tests:  Discussion of test results with performing physician:  Decision to obtain old records and/or obtain history from someone other than the patient:  Review and summarization of old records and/or obtaining history from someone other than the patient and.or discussion of case with another health care provider:  Independent visualization of image, tracing or specimen itself:    Time: total time spent with the patient was 15 minutes of which >50% was spent in counseling and coordination of care     Umer Poon MD

## 2021-06-14 NOTE — PROGRESS NOTES
"Office Visit - Follow up    Marcello Mariee   52 y.o. male    Date of Visit: 12/18/2017    Chief Complaint   Patient presents with     Pre-op Exam     Microdisectomy on 12/21 at [facility] [place] by Dr Horn       Subjective: Very pleasant 52-year-old gentleman with history of chronic esophagitis is here for preoperative evaluation prior to planned left might grow discectomy at L5 level performed by Dr. Latoya Smyth orthopedics.  See his notes regarding surgical indications HPI and surgical plans    History for the most part is negative for major health concerns has been seen in the past for symptoms of esophagitis which has been managed with a combination of sucralfate and pantoprazole generally he is stable he has had no major symptoms of esophagitis    Past history otherwise negative he has had previous orthopedic procedures were uncomplicated.  No history of anesthetic complications    No known allergies    He is a former smoker has not smoked for several years ethanol intake minimal to none    Social history negative family history unremarkable    Review of systems negative for new concerns        ROS: A comprehensive review of systems was performed and was otherwise negative except as mentioned above.     Exam  Normal mental status alert and oriented skin and lymphatics negative EENT unremarkable lungs clear heart normal abdomen benign extremities negative peripheral pulses normal see notes regarding orthopedic evaluation and orthopedic exam neuro exam reveals no focal findings.       /80  Pulse 60  Ht 6' 2\" (1.88 m)  Wt (!) 223 lb (101.2 kg)  BMI 28.63 kg/m2    Assessment and Plan  Persistent left L5 radiculopathy surgery planned    History of reflux esophagitis stable    Otherwise normal preoperative exam    Marcello was seen today for pre-op exam.    Diagnoses and all orders for this visit:    Screen for colon cancer  -     Ambulatory referral for Colonoscopy    Preop examination  -     " Electrocardiogram Perform and Read  -     HM2(CBC w/o Differential); Future  -     Basic Metabolic Panel  -     HM2(CBC w/o Differential)    Gastroesophageal reflux disease with esophagitis    Left lumbar radiculopathy          Time: total time spent with the patient was 35 minutes of which >50% was spent in counseling and coordination of care        No Known Allergies    Medications :  Prior to Admission medications    Medication Sig Start Date End Date Taking? Authorizing Provider   pantoprazole (PROTONIX) 20 MG tablet TAKE ONE T PO QD 7/20/17  Yes PROVIDER, HISTORICAL   sucralfate (CARAFATE) 1 gram tablet Take 1 tablet (1 g total) by mouth 2 (two) times a day with meals. 7/24/17 7/24/18 Yes Partha Weir MD        Past Medical History: No past medical history on file.    Past Surgical History: No past surgical history on file.    Social History:   Social History     Social History     Marital status:      Spouse name: N/A     Number of children: N/A     Years of education: N/A     Occupational History     Not on file.     Social History Main Topics     Smoking status: Former Smoker     Smokeless tobacco: Never Used     Alcohol use Not on file     Drug use: Not on file     Sexual activity: Not on file     Other Topics Concern     Not on file     Social History Narrative       Family History: No family history on file.      Partha Weir MD

## 2021-06-16 PROBLEM — K21.9 GERD (GASTROESOPHAGEAL REFLUX DISEASE): Status: ACTIVE | Noted: 2017-07-24

## 2021-06-18 NOTE — PROGRESS NOTES
Assessment/Plan:        Diagnoses and all orders for this visit:    Acute bronchitis, unspecified organism  -     azithromycin (ZITHROMAX Z-LIBBY) 250 MG tablet; 2 tabs (500 mg ) day #1, then 1 tab (250 mg) days #2-5, total 5 days  Dispense: 6 tablet; Refill: 0            Subjective:    Patient ID: Marcello Mariee is a 53 y.o. male.    Cough   This is a new problem. The current episode started in the past 7 days. The problem occurs constantly. The problem has been gradually worsening. Associated symptoms include congestion, coughing, fatigue, headaches and a sore throat. Pertinent negatives include no abdominal pain, chest pain, chills, fever, neck pain, rash, swollen glands or vomiting. The symptoms are aggravated by coughing. He has tried nothing for the symptoms. The treatment provided no relief.       The following portions of the patient's history were reviewed and updated as appropriate: allergies, current medications and problem list.    Review of Systems   Constitutional: Positive for fatigue. Negative for chills and fever.   HENT: Positive for congestion and sore throat.    Respiratory: Positive for cough.    Cardiovascular: Negative for chest pain.   Gastrointestinal: Negative for abdominal pain and vomiting.   Musculoskeletal: Negative for neck pain.   Skin: Negative for rash.   Neurological: Positive for headaches.   All other systems reviewed and are negative.            Objective:    Physical Exam   Constitutional: He appears well-developed and well-nourished. No distress.   HENT:   Head: Normocephalic and atraumatic.   Right Ear: External ear normal.   Left Ear: External ear normal.   Nose: Nose normal.   Mouth/Throat: Oropharynx is clear and moist. No oropharyngeal exudate.   Eyes: Conjunctivae and EOM are normal. Pupils are equal, round, and reactive to light. Right eye exhibits no discharge. Left eye exhibits no discharge.   Neck: Normal range of motion. Neck supple.   Cardiovascular: Normal rate,  regular rhythm, normal heart sounds and intact distal pulses.    Pulmonary/Chest: Effort normal and breath sounds normal. No respiratory distress. He has no wheezes. He has no rales.   Lymphadenopathy:     He has no cervical adenopathy.   Skin: Skin is warm and dry. He is not diaphoretic.

## 2021-06-18 NOTE — PROGRESS NOTES
"Office Visit - Follow up    Marcello Mariee   53 y.o. male    Date of Visit: 6/21/2018    Chief Complaint   Patient presents with     Follow-up     Congestion, chest pressure, headache, ears hurt for 5 days       Subjective: Franky is here with a several week history of upper respiratory congestion cough with headache symptoms and decreased hearing    Was seen by Dr. Rosas on May 31.  Notes are reviewed and he was treated with a azithromycin for productive bronchitis.    He noted the antibiotic resulted in improvement in his cough and reduce the amount of green phlegm.  He has however had persistent symptoms of a feeling of head fullness with congestion and decreased hearing particularly right ear he does not have a lot of productive phlegm but does feel intense head fullness bilaterally        ROS: A comprehensive review of systems was performed and was otherwise negative except as mentioned above.     Exam  Bilateral serous otitis with bulging TMs there is no erythema ear nose and throat exam otherwise unremarkable   /80  Pulse (!) 44  Ht 6' 2\" (1.88 m)  Wt (!) 232 lb (105.2 kg)  BMI 29.79 kg/m2    Assessment and Plan  Suspect allergic rhinitis or possible viral serous otitis.  Will treat with combination of antihistamine and decongestant if not improved in the next several days would consider a brief trial of systemic [id number]    Marcello was seen today for follow-up.    Diagnoses and all orders for this visit:    Seasonal allergic rhinitis    Serous otitis media    Other orders  -     fexofenadine-pseudoephedrine (ALLEGRA-D 24 HOUR) 180-240 mg per 24 hr tablet; Take 1 tablet by mouth daily.          Time: total time spent with the patient was 15 minutes of which >50% was spent in counseling and coordination of care        No Known Allergies    Medications :  Prior to Admission medications    Medication Sig Start Date End Date Taking? Authorizing Provider   pantoprazole (PROTONIX) 20 MG tablet TAKE ONE " T PO QD 7/20/17  Yes PROVIDER, HISTORICAL   sucralfate (CARAFATE) 1 gram tablet Take 1 tablet (1 g total) by mouth 2 (two) times a day with meals. 7/24/17 7/24/18 Yes Partha Weir MD   fexofenadine-pseudoephedrine (ALLEGRA-D 24 HOUR) 180-240 mg per 24 hr tablet Take 1 tablet by mouth daily. 6/21/18   Partha Weir MD        Past Medical History: No past medical history on file.    Past Surgical History: No past surgical history on file.    Social History:   Social History     Social History     Marital status:      Spouse name: N/A     Number of children: N/A     Years of education: N/A     Occupational History     Not on file.     Social History Main Topics     Smoking status: Former Smoker     Smokeless tobacco: Never Used     Alcohol use Not on file     Drug use: Not on file     Sexual activity: Not on file     Other Topics Concern     Not on file     Social History Narrative       Family History: No family history on file.      Partha Weir MD

## 2021-06-20 NOTE — LETTER
Letter by Ada Wyman MD at      Author: Ada Wyman MD Service: -- Author Type: --    Filed:  Encounter Date: 6/8/2020 Status: (Other)         Marcello Mariee  [address] [facility] [address]             June 8, 2020         Dear Mr. Mariee,    Below are the results from your recent visit:    Resulted Orders   Basic Metabolic Panel   Result Value Ref Range    Sodium 141 136 - 145 mmol/L    Potassium 4.8 3.5 - 5.0 mmol/L    Chloride 103 98 - 107 mmol/L    CO2 30 22 - 31 mmol/L    Anion Gap, Calculation 8 5 - 18 mmol/L    Glucose 105 70 - 125 mg/dL    Calcium 9.8 8.5 - 10.5 mg/dL    BUN 16 8 - 22 mg/dL    Creatinine 0.97 0.70 - 1.30 mg/dL    GFR MDRD Af Amer >60 >60 mL/min/1.73m2    GFR MDRD Non Af Amer >60 >60 mL/min/1.73m2    Narrative    Fasting Glucose reference range is 70-99 mg/dL per  American Diabetes Association (ADA) guidelines.   HM1 (CBC with Diff)   Result Value Ref Range    WBC 5.1 4.0 - 11.0 thou/uL    RBC 4.95 4.40 - 6.20 mill/uL    Hemoglobin 15.4 14.0 - 18.0 g/dL    Hematocrit 45.0 40.0 - 54.0 %    MCV 91 80 - 100 fL    MCH 31.0 27.0 - 34.0 pg    MCHC 34.1 32.0 - 36.0 g/dL    RDW 12.5 11.0 - 14.5 %    Platelets 265 140 - 440 thou/uL    MPV 7.4 7.0 - 10.0 fL    Neutrophils % 48 (L) 50 - 70 %    Lymphocytes % 41 (H) 20 - 40 %    Monocytes % 6 2 - 10 %    Eosinophils % 5 0 - 6 %    Basophils % 0 0 - 2 %    Neutrophils Absolute 2.4 2.0 - 7.7 thou/uL    Lymphocytes Absolute 2.1 0.8 - 4.4 thou/uL    Monocytes Absolute 0.3 0.0 - 0.9 thou/uL    Eosinophils Absolute 0.3 0.0 - 0.4 thou/uL    Basophils Absolute 0.0 0.0 - 0.2 thou/uL       Blood counts , electrolytes and kidney function tests are normal     Please call with questions or contact us using [phone] [employer].    Sincerely,        Electronically signed by Ada Wyman MD

## 2021-07-03 NOTE — ADDENDUM NOTE
Addendum Note by Nikki Arias MA at 10/9/2017  4:17 PM     Author: Nikki Arias MA Service: -- Author Type: Medical Assistant    Filed: 10/9/2017  4:17 PM Encounter Date: 10/9/2017 Status: Signed    : Nikki Arias MA (Medical Assistant)    Addended by: NIKKI ARIAS on: 10/9/2017 04:17 PM        Modules accepted: Orders

## 2021-07-04 ENCOUNTER — HEALTH MAINTENANCE LETTER (OUTPATIENT)
Age: 56
End: 2021-07-04

## 2021-10-24 ENCOUNTER — HEALTH MAINTENANCE LETTER (OUTPATIENT)
Age: 56
End: 2021-10-24

## 2022-07-31 ENCOUNTER — HEALTH MAINTENANCE LETTER (OUTPATIENT)
Age: 57
End: 2022-07-31

## 2022-10-15 ENCOUNTER — HEALTH MAINTENANCE LETTER (OUTPATIENT)
Age: 57
End: 2022-10-15

## 2023-08-20 ENCOUNTER — HEALTH MAINTENANCE LETTER (OUTPATIENT)
Age: 58
End: 2023-08-20